# Patient Record
Sex: MALE | Race: WHITE
[De-identification: names, ages, dates, MRNs, and addresses within clinical notes are randomized per-mention and may not be internally consistent; named-entity substitution may affect disease eponyms.]

---

## 2021-01-27 ENCOUNTER — HOSPITAL ENCOUNTER (OUTPATIENT)
Dept: HOSPITAL 75 - RAD FS | Age: 41
End: 2021-01-27
Attending: FAMILY MEDICINE
Payer: COMMERCIAL

## 2021-01-27 DIAGNOSIS — M25.511: Primary | ICD-10-CM

## 2021-01-27 PROCEDURE — 73030 X-RAY EXAM OF SHOULDER: CPT

## 2021-01-27 NOTE — DIAGNOSTIC IMAGING REPORT
CSS=call and assists for video appointment -see Claudette's note below     What  was your temp today? Never had any fever after Wednesday night -did not check today     How did you take your temp? N/a    Are you feeling short of breath today?   Very little INDICATION: Right shoulder pain.



Time of exam 9:02 AM



3 views of the right shoulder were obtained. 



The glenohumeral and acromioclavicular alignment are normal.

Acromial humeral space is normal. No fracture or dislocation is

identified.



IMPRESSION: No acute bony abnormality is detected.



Dictated by: 



  Dictated on workstation # [id number] that contains at least 60% alcohol. 8. As much as possible, stay in a specific room and away from other people in your home. Also, you should use a separate bathroom, if available.  If you need to be around other people in or outside of the home, wear a f

## 2021-08-24 ENCOUNTER — HOSPITAL ENCOUNTER (OUTPATIENT)
Dept: HOSPITAL 75 - RAD FS | Age: 41
End: 2021-08-24
Attending: FAMILY MEDICINE
Payer: SELF-PAY

## 2021-08-24 DIAGNOSIS — Z82.49: Primary | ICD-10-CM

## 2021-08-24 PROCEDURE — 75571 CT HRT W/O DYE W/CA TEST: CPT

## 2021-08-24 NOTE — DIAGNOSTIC IMAGING REPORT
INDICATION: Family history of heart disease and hypertension.



CT cardiac calcium scoring study performed with images of the

cardiac silhouette without contrast with calculation of calcium

scoring. Dose reduction protocol was used.



Raw data images demonstrate the thoracic aorta to be normal in

caliber. There are calcified nodes in the left hilum and in the

subcarinal region compatible with old granulomatous disease.

There is no pleural or pericardial fluid.  The visualized

portions of the lung fields show no suspicious findings. The

entirety of the lung fields are not included on this study.



There is no significant coronary calcification. Calcium score was

0.



IMPRESSION:



CTA coronary calcium scoring study performed and demonstrates no

significant coronary calcification. Calcium score is 0. There are

old granulomatous changes as above.



Dictated by: 



  Dictated on workstation # [id number]